# Patient Record
Sex: FEMALE | URBAN - METROPOLITAN AREA
[De-identification: names, ages, dates, MRNs, and addresses within clinical notes are randomized per-mention and may not be internally consistent; named-entity substitution may affect disease eponyms.]

---

## 2024-07-18 ENCOUNTER — APPOINTMENT (RX ONLY)
Dept: URBAN - METROPOLITAN AREA CLINIC 28 | Facility: CLINIC | Age: 59
Setting detail: DERMATOLOGY
End: 2024-07-18

## 2024-07-18 DIAGNOSIS — L57.8 OTHER SKIN CHANGES DUE TO CHRONIC EXPOSURE TO NONIONIZING RADIATION: ICD-10-CM | Status: STABLE

## 2024-07-18 DIAGNOSIS — Z85.828 PERSONAL HISTORY OF OTHER MALIGNANT NEOPLASM OF SKIN: ICD-10-CM

## 2024-07-18 DIAGNOSIS — L72.8 OTHER FOLLICULAR CYSTS OF THE SKIN AND SUBCUTANEOUS TISSUE: ICD-10-CM

## 2024-07-18 DIAGNOSIS — Z12.83 ENCOUNTER FOR SCREENING FOR MALIGNANT NEOPLASM OF SKIN: ICD-10-CM

## 2024-07-18 DIAGNOSIS — D485 NEOPLASM OF UNCERTAIN BEHAVIOR OF SKIN: ICD-10-CM

## 2024-07-18 DIAGNOSIS — L71.8 OTHER ROSACEA: ICD-10-CM | Status: STABLE

## 2024-07-18 PROBLEM — D48.5 NEOPLASM OF UNCERTAIN BEHAVIOR OF SKIN: Status: ACTIVE | Noted: 2024-07-18

## 2024-07-18 PROCEDURE — 99214 OFFICE O/P EST MOD 30 MIN: CPT | Mod: 25

## 2024-07-18 PROCEDURE — 11102 TANGNTL BX SKIN SINGLE LES: CPT

## 2024-07-18 PROCEDURE — ? PRESCRIPTION MEDICATION MANAGEMENT

## 2024-07-18 PROCEDURE — ? PRESCRIPTION

## 2024-07-18 PROCEDURE — ? COUNSELING

## 2024-07-18 PROCEDURE — ? BIOPSY BY SHAVE METHOD

## 2024-07-18 PROCEDURE — ? ADDITIONAL NOTES

## 2024-07-18 PROCEDURE — ? OBSERVATION AND MEASURE

## 2024-07-18 RX ORDER — TRETIONIN 0.5 MG/G
CREAM TOPICAL
Qty: 45 | Refills: 3 | Status: ERX | COMMUNITY
Start: 2024-07-18

## 2024-07-18 RX ORDER — METRONIDAZOLE 7.5 MG/G
CREAM TOPICAL
Qty: 45 | Refills: 3 | Status: ERX | COMMUNITY
Start: 2024-07-18

## 2024-07-18 RX ADMIN — TRETIONIN: 0.5 CREAM TOPICAL at 00:00

## 2024-07-18 RX ADMIN — METRONIDAZOLE: 7.5 CREAM TOPICAL at 00:00

## 2024-07-18 ASSESSMENT — LOCATION SIMPLE DESCRIPTION DERM
LOCATION SIMPLE: RIGHT CHEEK
LOCATION SIMPLE: LEFT CHEEK
LOCATION SIMPLE: LEFT SHOULDER
LOCATION SIMPLE: RIGHT THIGH
LOCATION SIMPLE: NOSE

## 2024-07-18 ASSESSMENT — LOCATION ZONE DERM
LOCATION ZONE: FACE
LOCATION ZONE: ARM
LOCATION ZONE: NOSE
LOCATION ZONE: LEG

## 2024-07-18 ASSESSMENT — LOCATION DETAILED DESCRIPTION DERM
LOCATION DETAILED: RIGHT ANTERIOR DISTAL THIGH
LOCATION DETAILED: LEFT INFERIOR CENTRAL MALAR CHEEK
LOCATION DETAILED: RIGHT SUPERIOR LATERAL MALAR CHEEK
LOCATION DETAILED: NASAL DORSUM
LOCATION DETAILED: LEFT ANTERIOR SHOULDER

## 2024-07-18 NOTE — PROCEDURE: BIOPSY BY SHAVE METHOD
Detail Level: Detailed
Depth Of Biopsy: dermis
Was A Bandage Applied: Yes
Size Of Lesion In Cm: 0
Biopsy Type: H and E
Biopsy Method: Personna blade
Anesthesia Type: 1% lidocaine with epinephrine
Anesthesia Volume In Cc: 0.5
Hemostasis: Electrocautery and Aluminum Chloride
Wound Care: Petrolatum
Dressing: bandage
Destruction After The Procedure: No
Type Of Destruction Used: Curettage
Curettage Text: The wound bed was treated with curettage after the biopsy was performed.
Cryotherapy Text: The wound bed was treated with cryotherapy after the biopsy was performed.
Electrodesiccation Text: The wound bed was treated with electrodesiccation after the biopsy was performed.
Electrodesiccation And Curettage Text: The wound bed was treated with electrodesiccation and curettage after the biopsy was performed.
Silver Nitrate Text: The wound bed was treated with silver nitrate after the biopsy was performed.
Lab: [id number]
Lab Facility: [id number]
Path Notes (To The Dermatopathologist): 3.5 x 7 mm
Consent: Written consent was obtained and risks were reviewed including but not limited to scarring, infection, bleeding, scabbing, incomplete removal, nerve damage and allergy to anesthesia.
Post-Care Instructions: I reviewed with the patient in detail post-care instructions. Patient is to keep the biopsy site dry overnight, and then apply bacitracin twice daily until healed. Patient may apply hydrogen peroxide soaks to remove any crusting.
Notification Instructions: Patient will be notified of biopsy results. However, patient instructed to call the office if not contacted within 2 weeks.
Billing Type: Third-Party Bill
Information: Selecting Yes will display possible errors in your note based on the variables you have selected. This validation is only offered as a suggestion for you. PLEASE NOTE THAT THE VALIDATION TEXT WILL BE REMOVED WHEN YOU FINALIZE YOUR NOTE. IF YOU WANT TO FAX A PRELIMINARY NOTE YOU WILL NEED TO TOGGLE THIS TO 'NO' IF YOU DO NOT WANT IT IN YOUR FAXED NOTE.

## 2024-07-18 NOTE — PROCEDURE: COUNSELING
Addended by: Iris De La Vega on: 2/4/2019 11:17 AM     Modules accepted: Orders
Detail Level: Generalized
Skin Checks Recommendations: SSE q month\\nFBSE with derm q 6 mos due to so many past skin cancers and AKs
Moisturizer Recommendations: Cetaphil cream at HS
Detail Level: Detailed
Sunscreen Recommendations: gabriel
Cleanser Recommendations: gentle
Nicotinamide Supplementation Recommendations: Continue 500mg BID
Topical Retinoids Recommendations: Begin with otc Gold Ambrose with Retinol to arms and legs at HS\\nConsider Rx Tretinoin, discussed

## 2024-07-18 NOTE — PROCEDURE: ADDITIONAL NOTES
Detail Level: Simple
Render Risk Assessment In Note?: no
Additional Notes: Discusssed that it feels like a cyst but it is possible it could be vascular. Discussed excision with her derm surgeon, Dr. Sheppard, so path could be done to be sure of diagnosis, but risks and benefits of excision vs observation discussed. Since it is stable, the patient agrees with observation for now and will consider getting it surgically removed with any changes or symptoms, can return here for recheck or go straight to Dr. Sheppard.

## 2024-07-18 NOTE — HPI: OTHER
Condition:: Cystic like bump of right cheek
Please Describe Your Condition:: The patient has just recently been noticing a small bump under the skin of the right cheek. No symptoms and doesn't seem to be growing. She thought maybe it was a blood vessel growth since there is a little hue to it. She's been watching it.

## 2024-07-18 NOTE — PROCEDURE: PRESCRIPTION MEDICATION MANAGEMENT
Detail Level: Zone
Render In Strict Bullet Format?: No
Continue Regimen: Metrocream 0.75% to acne prone areas on face bid
Initiate Treatment: Tretinoin cream 0.05% apply pea size amount to face nightly, start every other night and increase as tolerated.

## 2024-08-01 ENCOUNTER — RX ONLY (OUTPATIENT)
Age: 59
Setting detail: RX ONLY
End: 2024-08-01

## 2024-08-01 RX ORDER — IMIQUIMOD 50 MG/G
CREAM TOPICAL
Qty: 30 | Refills: 0 | Status: ERX | COMMUNITY
Start: 2024-08-01

## 2024-09-04 ENCOUNTER — APPOINTMENT (RX ONLY)
Dept: URBAN - METROPOLITAN AREA CLINIC 28 | Facility: CLINIC | Age: 59
Setting detail: DERMATOLOGY
End: 2024-09-04

## 2024-09-04 DIAGNOSIS — L57.0 ACTINIC KERATOSIS: ICD-10-CM

## 2024-09-04 PROBLEM — C44.712 BASAL CELL CARCINOMA OF SKIN OF RIGHT LOWER LIMB, INCLUDING HIP: Status: ACTIVE | Noted: 2024-09-04

## 2024-09-04 PROCEDURE — 17000 DESTRUCT PREMALG LESION: CPT

## 2024-09-04 PROCEDURE — ? COUNSELING

## 2024-09-04 PROCEDURE — 99213 OFFICE O/P EST LOW 20 MIN: CPT | Mod: 25

## 2024-09-04 PROCEDURE — ? LIQUID NITROGEN

## 2024-09-04 PROCEDURE — ? ADDITIONAL NOTES

## 2024-09-04 ASSESSMENT — LOCATION ZONE DERM: LOCATION ZONE: ARM

## 2024-09-04 ASSESSMENT — LOCATION SIMPLE DESCRIPTION DERM: LOCATION SIMPLE: LEFT UPPER ARM

## 2024-09-04 ASSESSMENT — LOCATION DETAILED DESCRIPTION DERM: LOCATION DETAILED: LEFT ANTERIOR PROXIMAL UPPER ARM

## 2024-09-04 NOTE — PROCEDURE: ADDITIONAL NOTES
Detail Level: Simple
Render Risk Assessment In Note?: no
Additional Notes: --\\n\\nBiopsy proven [facility] on 7/18/24 Right anterior thigh\\n\\nPatient has been using the imiquimod cream 5 nights a week for 3 weeks. She is jail through her prescription (3 weeks in) Discussed that it is working well. Since we were able to check the area half way through, patient does not need to come in again this fall. Will recheck at follow up in January unless she has any concerns sooner. Proper use discussed. Report fever or chills or severe rash. Allow healing time after finishes the 6 weeks of treatment, then we watch.

## 2024-09-04 NOTE — HPI: OTHER
Condition:: Skin lesion left upper arm close to shoulder
Please Describe Your Condition:: is an established patient who is being seen for a chief complaint of Skin lesion left upper arm close to shoulder. patient has it for 3 weeks. It is small and has a small dark scab, raised and roughh. The patient has personal history of Basal and squamous skin cancer and AKs.\\n\\n2. The superficial basal cell carcinoma on her R thigh is being treated with Imiquimod cream- doing well - follow up is due in few weeks- no concerns on it today. She has completed 3 weeks of the imiquimod cream. She has a little bit of a rash from it, but not too bad. No fever or chills.

## 2024-09-04 NOTE — PROCEDURE: COUNSELING
Detail Level: Detailed
Patient Specific Counseling (Will Not Stick From Patient To Patient): -\\n\\n\\nDiscussed options include observation vs freezing it vs biopsy. Risks vs benefits of each option discussed. Patient agrees with cryotherapy for now. Discussed if still persists will biopsy the spot. RTC if it persists or recurs after 6-8 weeks.

## 2025-03-31 ENCOUNTER — APPOINTMENT (OUTPATIENT)
Dept: URBAN - METROPOLITAN AREA CLINIC 28 | Facility: CLINIC | Age: 60
Setting detail: DERMATOLOGY
End: 2025-03-31

## 2025-03-31 DIAGNOSIS — L57.8 OTHER SKIN CHANGES DUE TO CHRONIC EXPOSURE TO NONIONIZING RADIATION: ICD-10-CM | Status: INADEQUATELY CONTROLLED

## 2025-03-31 DIAGNOSIS — Z87.2 PERSONAL HISTORY OF DISEASES OF THE SKIN AND SUBCUTANEOUS TISSUE: ICD-10-CM

## 2025-03-31 DIAGNOSIS — Z86.19 PERSONAL HISTORY OF OTHER INFECTIOUS AND PARASITIC DISEASES: ICD-10-CM

## 2025-03-31 DIAGNOSIS — L57.8 OTHER SKIN CHANGES DUE TO CHRONIC EXPOSURE TO NONIONIZING RADIATION: ICD-10-CM

## 2025-03-31 DIAGNOSIS — L71.8 OTHER ROSACEA: ICD-10-CM | Status: STABLE

## 2025-03-31 DIAGNOSIS — Z12.83 ENCOUNTER FOR SCREENING FOR MALIGNANT NEOPLASM OF SKIN: ICD-10-CM

## 2025-03-31 DIAGNOSIS — Z85.828 PERSONAL HISTORY OF OTHER MALIGNANT NEOPLASM OF SKIN: ICD-10-CM

## 2025-03-31 PROCEDURE — ? PRESCRIPTION

## 2025-03-31 PROCEDURE — ? COUNSELING

## 2025-03-31 PROCEDURE — 99214 OFFICE O/P EST MOD 30 MIN: CPT

## 2025-03-31 PROCEDURE — ? PRESCRIPTION MEDICATION MANAGEMENT

## 2025-03-31 RX ORDER — FLUOROURACIL 5 MG/G
CREAM TOPICAL
Qty: 40 | Refills: 0 | Status: ERX | COMMUNITY
Start: 2025-03-31

## 2025-03-31 RX ADMIN — FLUOROURACIL: 5 CREAM TOPICAL at 00:00

## 2025-03-31 ASSESSMENT — LOCATION DETAILED DESCRIPTION DERM
LOCATION DETAILED: SUPERIOR MID FOREHEAD
LOCATION DETAILED: RIGHT PROXIMAL DORSAL FOREARM
LOCATION DETAILED: RIGHT PROXIMAL DORSAL FOREARM

## 2025-03-31 ASSESSMENT — LOCATION SIMPLE DESCRIPTION DERM
LOCATION SIMPLE: RIGHT FOREARM
LOCATION SIMPLE: RIGHT FOREARM
LOCATION SIMPLE: SUPERIOR FOREHEAD

## 2025-03-31 ASSESSMENT — LOCATION ZONE DERM
LOCATION ZONE: ARM
LOCATION ZONE: FACE
LOCATION ZONE: ARM

## 2025-03-31 NOTE — PROCEDURE: COUNSELING
Patient Specific Counseling (Will Not Stick From Patient To Patient): -\\n\\nA.) Right forearm near an old mohs scar \\n\\nRecommended to treat this area with the Fluorouracil cream and she agrees with this plan. \\nRecommended to treat the right forearm nightly for 3-4 weeks with the Fluorouracil cream.\\nMake sure to check the expiration date of the Fluorouracil cream and discard her old tube at home if it is . \\nPatient might treat this one after her trip (leaving may 1st)\\n\\n\\nB.) Forehead/small field central forehead\\n\\nRecommended to treat this area with the Fluorouracil cream as well nightly for 2 weeks and the patient agrees with this plan also. \\nPatient will go ahead and treat this one before her trip. \\nMake sure to wash your hands after each use and you can always stop early if the rash is severe.\\n\\n\\ncheck for resolution of theses spots in 6-8 weeks. Biopsy discussed of any persistent unusual spot. Yazmin agrees with no biopsies today and to trying the FU cream first. 
Detail Level: Detailed
Skin Checks Recommendations: SSE q month\\nFBSE with derm q year with sun exposed check or FBSE at 6 mos [facility]
Detail Level: Generalized
Topical Retinoids Recommendations: Begin with otc Gold Ambrose with Retinol to arms and legs at HS\\nConsider Rx Tretinoin, discussed
Nicotinamide Supplementation Recommendations: Continue 500mg BID

## 2025-03-31 NOTE — PROCEDURE: PRESCRIPTION MEDICATION MANAGEMENT
Initiate Treatment: Fluorouracil cream apply to the affected areas of the right forearm nightly for 3-4 weeks and to the affected areas of the forehead nightly for 14 days; wash hands after use; stop early with severe rash
Render In Strict Bullet Format?: No
Detail Level: Zone
Continue Regimen: Metrocream 0.75% apply to the rosacea prone areas twice a day
Continue Regimen: Tretinoin cream apply to the face every other night; then increase to nightly as tolerated.
Modify Regimen: no tretinoin on the forehead while using and recovering from the FU cream use

## 2025-03-31 NOTE — HPI: OTHER
Condition:: Full body exam
Please Describe Your Condition:: Patient does have history of basal cell carcinoma, squamous cell carcinoma and Actinic keratosis.
Condition:: Lesion
Please Describe Your Condition:: Patient reports a spot on the forehead that she has been noticing since January. She said it will get bright pink and scaly but then it will calm down.